# Patient Record
Sex: FEMALE | Race: WHITE | NOT HISPANIC OR LATINO
[De-identification: names, ages, dates, MRNs, and addresses within clinical notes are randomized per-mention and may not be internally consistent; named-entity substitution may affect disease eponyms.]

---

## 2024-09-10 PROBLEM — Z00.00 ENCOUNTER FOR PREVENTIVE HEALTH EXAMINATION: Status: ACTIVE | Noted: 2024-09-10

## 2024-09-16 ENCOUNTER — APPOINTMENT (OUTPATIENT)
Dept: BREAST CENTER | Facility: CLINIC | Age: 24
End: 2024-09-16
Payer: COMMERCIAL

## 2024-09-16 VITALS
HEART RATE: 81 BPM | BODY MASS INDEX: 23.39 KG/M2 | DIASTOLIC BLOOD PRESSURE: 78 MMHG | HEIGHT: 64 IN | WEIGHT: 137 LBS | SYSTOLIC BLOOD PRESSURE: 116 MMHG | OXYGEN SATURATION: 100 %

## 2024-09-16 DIAGNOSIS — Z78.9 OTHER SPECIFIED HEALTH STATUS: ICD-10-CM

## 2024-09-16 DIAGNOSIS — Z80.42 FAMILY HISTORY OF MALIGNANT NEOPLASM OF PROSTATE: ICD-10-CM

## 2024-09-16 DIAGNOSIS — Z87.19 PERSONAL HISTORY OF OTHER DISEASES OF THE DIGESTIVE SYSTEM: ICD-10-CM

## 2024-09-16 DIAGNOSIS — Z80.3 FAMILY HISTORY OF MALIGNANT NEOPLASM OF BREAST: ICD-10-CM

## 2024-09-16 DIAGNOSIS — Z72.821 INADEQUATE SLEEP HYGIENE: ICD-10-CM

## 2024-09-16 DIAGNOSIS — N60.12 DIFFUSE CYSTIC MASTOPATHY OF RIGHT BREAST: ICD-10-CM

## 2024-09-16 DIAGNOSIS — N60.11 DIFFUSE CYSTIC MASTOPATHY OF RIGHT BREAST: ICD-10-CM

## 2024-09-16 DIAGNOSIS — Z86.59 PERSONAL HISTORY OF OTHER MENTAL AND BEHAVIORAL DISORDERS: ICD-10-CM

## 2024-09-16 DIAGNOSIS — Z12.39 ENCOUNTER FOR OTHER SCREENING FOR MALIGNANT NEOPLASM OF BREAST: ICD-10-CM

## 2024-09-16 PROCEDURE — 99203 OFFICE O/P NEW LOW 30 MIN: CPT

## 2024-09-16 RX ORDER — SERTRALINE HYDROCHLORIDE 25 MG/1
TABLET, FILM COATED ORAL
Refills: 0 | Status: ACTIVE | COMMUNITY

## 2024-09-16 RX ORDER — QUETIAPINE 400 MG/1
TABLET, FILM COATED ORAL
Refills: 0 | Status: ACTIVE | COMMUNITY

## 2024-09-16 RX ORDER — ESCITALOPRAM OXALATE 5 MG/1
5 TABLET, FILM COATED ORAL
Refills: 0 | Status: ACTIVE | COMMUNITY

## 2024-09-16 NOTE — REASON FOR VISIT
[Initial Evaluation] : an initial evaluation [FreeTextEntry1] : The patient is a premenopausal nulliparous white female of [place]ken[place] descent with a strong family history of breast cancer and a known BRCA1 mutation on her paternal side.  The patient was self ended up genetic testing negative in May 2024. Her sister tested BRCA2 positive.  She comes in today for a discussion regarding breast cancer risk reduction/surveillance.

## 2024-09-16 NOTE — PHYSICAL EXAM
[Normocephalic] : normocephalic [Atraumatic] : atraumatic [EOMI] : extra ocular movement intact [Supple] : supple [No Supraclavicular Adenopathy] : no supraclavicular adenopathy [No Cervical Adenopathy] : no cervical adenopathy [Examined in the supine and seated position] : examined in the supine and seated position [No dominant masses] : no dominant masses in right breast  [No dominant masses] : no dominant masses left breast [No Nipple Retraction] : no left nipple retraction [No Nipple Discharge] : no left nipple discharge [Breast Mass Right Breast ___cm] : no masses [Breast Mass Left Breast ___cm] : no masses [Breast Nipple Inversion] : nipples not inverted [Breast Nipple Retraction] : nipples not retracted [Breast Nipple Flattening] : nipples not flattened [Breast Nipple Fissures] : nipples not fissured [Breast Abnormal Lactation (Galactorrhea)] : no galactorrhea [Breast Abnormal Secretion Bloody Fluid] : no bloody discharge [Breast Abnormal Secretion Serous Fluid] : no serous discharge [Breast Abnormal Secretion Opalescent Fluid] : no milky discharge [No Axillary Lymphadenopathy] : no left axillary lymphadenopathy [No Edema] : no edema [No Rashes] : no rashes [No Ulceration] : no ulceration [de-identified] : On exam, the patient has moderately ptotic full C-cup breasts.  On palpation, I cannot feel any suspicious densities in either breast.  She has no axillary, supraclavicular, or cervical adenopathy.

## 2024-09-16 NOTE — ASSESSMENT
[FreeTextEntry1] : The patient is a 24-year-old nulliparous premenopausal white female of Ashkenazi [place] descent.  She underwent menarche at age 13.  Her menses are regular and she is on oral birth control.  She has a strong family history of breast cancer with her paternal grandfather who had breast and prostate cancer at age 65 and her paternal grandmother who had breast cancer at age 45.  She has 2 maternal aunts with breast cancer at age 42 and 45 and her maternal great grandmother had breast cancer at age 45.  She has a paternal second cousin who had breast cancer.  The patient's sister, Garrett, is a patient of mine who ended up testing BRCA2 positive with a 9168delA mutation earlier in 2024.  It turns out that the maternal side of family all genetic tested and were negative so the BRCA2 mutation came from the patient's paternal side.  The patient herself underwent [facility] genetic panel testing in May 2024 which was negative.  The patient has not noted any breast masses and has not undergone any breast biopsies.  She comes in today with her mother to discuss risk reduction strategies and breast cancer screening/surveillance given her strong family history.  On exam, the patient's breasts are symmetrical bilaterally and she has no suspicious findings in either breast.  I spoke to the patient with her mother present regarding her current risk of breast cancer.  Given that she did not inherit the BRCA2 mutation from her paternal side, the patient's increased risk is just related to her maternal family history of breast cancer and there is no known breast cancer genetic mutation on her maternal side.  She should just be followed as a high risk patient but I see no need for any screening imaging in her 20s unless she develops some clinical findings.  The patient should be getting yearly breast exams starting now.  I would consider a baseline screening mammography in her early 30s and then determining if she needs more routine screening at that time.  MRI screening could be considered as well after rigidity.  Otherwise, I would like her to continue to get yearly breast exams and she can do this by seeing me yearly or seeing her gynecologist yearly for routine breast exam.  If she notices any clinical findings such as any breast masses, nipple discharge, or skin dimpling, she should call for an earlier exam.  The patient and her mother understand the current screening plan and can follow-up with me on a as needed basis.  She should be following up with a gynecologist yearly.  All their questions were answered.

## 2024-09-16 NOTE — HISTORY OF PRESENT ILLNESS
[FreeTextEntry1] : The patient is a 24-year-old nulliparous premenopausal white female of Ashkenazi [place] descent.  She underwent menarche at age 13.  Her menses are regular and she is on oral birth control.  She has a strong family history of breast cancer with her paternal grandfather who had breast and prostate cancer at age 65 and her paternal grandmother who had breast cancer at age 45.  She has 2 maternal aunts with breast cancer at age 42 and 45 and her maternal great grandmother had breast cancer at age 45.  She has a paternal second cousin who had breast cancer.  The patient's sister, Garrett, is a patient of mine who ended up testing BRCA2 positive with a 9168delA mutation earlier in 2024. It turns out that the maternal side of family all genetic tested and were negative so the BRCA2 mutation came from the patient's paternal side.   The patient herself underwent [employer] genetic panel testing in May 2024 which was negative.  The patient has not noted any breast masses and has not undergone any breast biopsies.  She comes in today with her mother to discuss risk reduction strategies and breast cancer screening/surveillance given her strong family history.

## 2024-09-16 NOTE — ADDENDUM
[FreeTextEntry1] : I spent greater than 75% the consultation in face-to-face counseling and coordination care in this patient with a strong family history of breast cancer who is of Ashkenazi [place] descent but has no known genetic mutation for breast cancer and comes in for consultation regarding risk reduction and breast cancer screening/surveillance.

## 2024-11-11 ENCOUNTER — APPOINTMENT (OUTPATIENT)
Dept: PLASTIC SURGERY | Facility: CLINIC | Age: 24
End: 2024-11-11
Payer: COMMERCIAL

## 2024-11-11 ENCOUNTER — NON-APPOINTMENT (OUTPATIENT)
Age: 24
End: 2024-11-11

## 2024-11-11 DIAGNOSIS — L30.4 ERYTHEMA INTERTRIGO: ICD-10-CM

## 2024-11-11 DIAGNOSIS — M54.2 CERVICALGIA: ICD-10-CM

## 2024-11-11 DIAGNOSIS — N62 HYPERTROPHY OF BREAST: ICD-10-CM

## 2024-11-11 PROCEDURE — 99204 OFFICE O/P NEW MOD 45 MIN: CPT

## 2025-08-19 ENCOUNTER — APPOINTMENT (OUTPATIENT)
Dept: PLASTIC SURGERY | Facility: CLINIC | Age: 25
End: 2025-08-19
Payer: COMMERCIAL

## 2025-08-19 ENCOUNTER — NON-APPOINTMENT (OUTPATIENT)
Age: 25
End: 2025-08-19

## 2025-08-19 VITALS — BODY MASS INDEX: 23.05 KG/M2 | OXYGEN SATURATION: 100 % | HEART RATE: 78 BPM | WEIGHT: 135 LBS | HEIGHT: 64 IN

## 2025-08-19 DIAGNOSIS — N62 HYPERTROPHY OF BREAST: ICD-10-CM

## 2025-08-19 PROCEDURE — 99214 OFFICE O/P EST MOD 30 MIN: CPT

## 2025-08-19 RX ORDER — NORGESTIMATE AND ETHINYL ESTRADIOL 0.25-0.035
0.25-35 KIT ORAL
Refills: 0 | Status: ACTIVE | COMMUNITY

## 2025-08-29 ENCOUNTER — APPOINTMENT (OUTPATIENT)
Dept: PLASTIC SURGERY | Facility: CLINIC | Age: 25
End: 2025-08-29

## 2025-08-29 ENCOUNTER — TRANSCRIPTION ENCOUNTER (OUTPATIENT)
Age: 25
End: 2025-08-29

## 2025-08-29 DIAGNOSIS — Z98.890 OTHER SPECIFIED POSTPROCEDURAL STATES: ICD-10-CM

## 2025-08-29 PROCEDURE — 99213 OFFICE O/P EST LOW 20 MIN: CPT | Mod: 95

## 2025-08-29 RX ORDER — IBUPROFEN 600 MG/1
600 TABLET, FILM COATED ORAL EVERY 6 HOURS
Qty: 28 | Refills: 0 | Status: ACTIVE | COMMUNITY
Start: 2025-08-29 | End: 1900-01-01

## 2025-08-29 RX ORDER — OXYCODONE 5 MG/1
5 TABLET ORAL
Qty: 12 | Refills: 0 | Status: ACTIVE | COMMUNITY
Start: 2025-08-29 | End: 1900-01-01

## 2025-09-02 ENCOUNTER — APPOINTMENT (OUTPATIENT)
Dept: INTERNAL MEDICINE | Facility: CLINIC | Age: 25
End: 2025-09-02
Payer: COMMERCIAL

## 2025-09-02 VITALS
BODY MASS INDEX: 23.05 KG/M2 | HEART RATE: 75 BPM | OXYGEN SATURATION: 99 % | SYSTOLIC BLOOD PRESSURE: 111 MMHG | TEMPERATURE: 97.1 F | HEIGHT: 64 IN | WEIGHT: 135 LBS | DIASTOLIC BLOOD PRESSURE: 72 MMHG

## 2025-09-02 DIAGNOSIS — Z01.818 ENCOUNTER FOR OTHER PREPROCEDURAL EXAMINATION: ICD-10-CM

## 2025-09-02 PROCEDURE — 36415 COLL VENOUS BLD VENIPUNCTURE: CPT

## 2025-09-02 PROCEDURE — 99204 OFFICE O/P NEW MOD 45 MIN: CPT

## 2025-09-02 PROCEDURE — G0403: CPT

## 2025-09-04 LAB
ALBUMIN SERPL ELPH-MCNC: 4.2 G/DL
ALP BLD-CCNC: 42 U/L
ALT SERPL-CCNC: 15 U/L
ANION GAP SERPL CALC-SCNC: 12 MMOL/L
APTT BLD: 32 SEC
AST SERPL-CCNC: 16 U/L
BILIRUB SERPL-MCNC: 0.4 MG/DL
BUN SERPL-MCNC: 15 MG/DL
CALCIUM SERPL-MCNC: 9.9 MG/DL
CHLORIDE SERPL-SCNC: 101 MMOL/L
CO2 SERPL-SCNC: 24 MMOL/L
CREAT SERPL-MCNC: 0.67 MG/DL
EGFRCR SERPLBLD CKD-EPI 2021: 124 ML/MIN/1.73M2
GLUCOSE SERPL-MCNC: 74 MG/DL
HCT VFR BLD CALC: 41.7 %
HGB BLD-MCNC: 13.1 G/DL
INR PPP: 1.03 RATIO
MCHC RBC-ENTMCNC: 28.5 PG
MCHC RBC-ENTMCNC: 31.4 G/DL
MCV RBC AUTO: 90.7 FL
PLATELET # BLD AUTO: 319 K/UL
POTASSIUM SERPL-SCNC: 5 MMOL/L
PROT SERPL-MCNC: 7.5 G/DL
PT BLD: 11.9 SEC
RBC # BLD: 4.6 M/UL
RBC # FLD: 12.8 %
SODIUM SERPL-SCNC: 138 MMOL/L
WBC # FLD AUTO: 7.13 K/UL

## 2025-09-15 ENCOUNTER — TRANSCRIPTION ENCOUNTER (OUTPATIENT)
Age: 25
End: 2025-09-15

## 2025-09-15 ENCOUNTER — RESULT REVIEW (OUTPATIENT)
Age: 25
End: 2025-09-15